# Patient Record
(demographics unavailable — no encounter records)

---

## 2025-05-29 NOTE — PHYSICAL EXAM
[FreeTextEntry1] : Physical examination  General: No acute distress, Awake, Alert.   Mental status  Awake, alert, and oriented to person, time and place, Normal attention span and concentration, Recent and remote memory intact, Language intact, Fund of knowledge intact.  Cranial Nerves  II: VFF  III, IV, VI: PERRL, EOMI.  V: Facial sensation is normal B/L.  VII: Facial strength is normal B/L.  VIII: Gross hearing is intact.  IX, X: Palate is midline and elevates symmetrically.  XI: Trapezius normal strength.  XII: Tongue midline without atrophy or fasciculations.   Motor exam  Muscle tone - no evidence of rigidity or resistance in all 4 extremities.  No atrophy or fasciculations  Muscle Strength: arms and legs, proximal and distal flexors and extensors are normal  No UE drift.  Reflexes  All present, normal, and symmetrical.  Plantars right: mute.  Plantars left: mute.  Absent ankle jerks.   Coordination  Finger to nose: Normal.  Heel to shin: Normal.   Sensory  Intact sensation to vibration and cold.  Gait  Normal

## 2025-05-29 NOTE — HISTORY OF PRESENT ILLNESS
[FreeTextEntry1] : Melba Mckeon is an 87 year old woman with a pertinent medical history of HTN, atherosclerosis, osteoarthritis, chronic lumbar radiculopathy and anxiety, presenting today for consultation and evaluation of headaches.   Headache Age of onset: Location  Description Unilateral or bilateral Quality- pulsating, throbbing, stabbing  Nausea or vomiting Light or sound sensitivity Warning/aura (premonitory phase)- lights, yawning, hunger, diarrhea Postdrome: cognitive slowing, fatigue  Nocturnal awakening Associated with exertion or Valsalva  Once headache starts- duration: With medication: Without medication:  Average number of headache days per week:  Association with menstrual cycle Contraceptive use  Triggers Light, odors, smoke, heat Foods Stress/stress let down Weather Hormone fluctuation Alcohol Not eating Sleep cycle Hydration  Sleep pattern: CAMRYN: Water intake: Caffeine intake: Anxiety/depression/stress: Family History of headaches: Personal history of head trauma:  Preventatives tried: Rescue tried:  Imaging: